# Patient Record
Sex: MALE | URBAN - METROPOLITAN AREA
[De-identification: names, ages, dates, MRNs, and addresses within clinical notes are randomized per-mention and may not be internally consistent; named-entity substitution may affect disease eponyms.]

---

## 2020-02-18 ENCOUNTER — OFFICE VISIT (OUTPATIENT)
Dept: URBAN - METROPOLITAN AREA CLINIC 72 | Facility: CLINIC | Age: 85
End: 2020-02-18
Payer: COMMERCIAL

## 2020-02-18 DIAGNOSIS — H35.3131 NONEXUDATIVE AGE-RELATED MACULAR DEGENERATION, BILATERAL, EARLY DRY STAGE: Primary | ICD-10-CM

## 2020-02-18 DIAGNOSIS — H52.4 PRESBYOPIA: ICD-10-CM

## 2020-02-18 DIAGNOSIS — H43.813 VITREOUS DEGENERATION, BILATERAL: ICD-10-CM

## 2020-02-18 PROCEDURE — 99204 OFFICE O/P NEW MOD 45 MIN: CPT | Performed by: OPTOMETRIST

## 2020-02-18 ASSESSMENT — INTRAOCULAR PRESSURE
OS: 8
OD: 10

## 2020-02-18 ASSESSMENT — VISUAL ACUITY
OS: 20/40
OD: 20/25

## 2020-02-18 NOTE — IMPRESSION/PLAN
Impression: Nonexudative age-related macular degeneration, bilateral, early dry stage: H35.3131. Plan: Macular degeneration, dry type with stable vision. patient given updated glasses rx. Will continue to monitor vision and the patient has been instructed to call with any vision changes.

## 2020-06-17 ENCOUNTER — OFFICE VISIT (OUTPATIENT)
Dept: URBAN - METROPOLITAN AREA CLINIC 72 | Facility: CLINIC | Age: 85
End: 2020-06-17
Payer: COMMERCIAL

## 2020-06-17 DIAGNOSIS — Z96.1 PRESENCE OF INTRAOCULAR LENS: ICD-10-CM

## 2020-06-17 PROCEDURE — 92014 COMPRE OPH EXAM EST PT 1/>: CPT | Performed by: OPTOMETRIST

## 2020-06-17 PROCEDURE — 92134 CPTRZ OPH DX IMG PST SGM RTA: CPT | Performed by: OPTOMETRIST

## 2020-06-17 ASSESSMENT — INTRAOCULAR PRESSURE
OD: 10
OS: 10

## 2020-06-17 NOTE — IMPRESSION/PLAN
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 Plan: OU:  Will continue to observe condition

## 2020-06-17 NOTE — IMPRESSION/PLAN
Impression: Nonexudative age-related macular degeneration, bilateral, early dry stage: H35.3131. Plan: Macular degeneration, dry type with stable vision. discussed the importance of wearing sunglasses whenever outside. Starting on [employer] eye vitamins. recommend the patient start monitoring his vision at home a couple time a week. patient advised if vision changes or worsens to RTC. will continue to monitor in 4 months with OCT imaging.

## 2020-08-21 ENCOUNTER — OFFICE VISIT (OUTPATIENT)
Dept: URBAN - METROPOLITAN AREA CLINIC 72 | Facility: CLINIC | Age: 85
End: 2020-08-21
Payer: COMMERCIAL

## 2020-08-21 PROCEDURE — 92014 COMPRE OPH EXAM EST PT 1/>: CPT | Performed by: OPTOMETRIST

## 2020-08-21 ASSESSMENT — VISUAL ACUITY
OS: 20/40
OD: 20/30

## 2020-08-21 ASSESSMENT — INTRAOCULAR PRESSURE
OS: 10
OD: 9

## 2020-08-21 NOTE — IMPRESSION/PLAN
Impression: Presbyopia: H52.4. Plan: New spec Rx given - Discussed changes and possible adaptation period.

## 2020-08-21 NOTE — IMPRESSION/PLAN
Impression: Nonexudative age-related macular degeneration, bilateral, early dry stage: H35.3131. Macular degeneration, dry type with stable vision. Plan: Observe. Pt to continue w/ eye vitamins. patient advised if vision changes or worsens to RTC. will continue to monitor in 4-6 months with OCT imaging.

## 2022-01-25 ENCOUNTER — OFFICE VISIT (OUTPATIENT)
Dept: URBAN - METROPOLITAN AREA CLINIC 72 | Facility: CLINIC | Age: 87
End: 2022-01-25
Payer: COMMERCIAL

## 2022-01-25 DIAGNOSIS — H35.3194 NONEXUDATIVE AGE-RELATED MACULAR DEGENERATION, ADVANCED ATROPHIC W/ SUBFOVEAL INVOLVEMENT: Primary | ICD-10-CM

## 2022-01-25 DIAGNOSIS — E11.9 TYPE 2 DIABETES MELLITUS WITHOUT COMPLICATIONS: ICD-10-CM

## 2022-01-25 PROCEDURE — 92134 CPTRZ OPH DX IMG PST SGM RTA: CPT | Performed by: OPTOMETRIST

## 2022-01-25 PROCEDURE — 99214 OFFICE O/P EST MOD 30 MIN: CPT | Performed by: OPTOMETRIST

## 2022-01-25 ASSESSMENT — INTRAOCULAR PRESSURE
OS: 10
OD: 12

## 2022-01-25 NOTE — IMPRESSION/PLAN
Impression: Type 2 diabetes mellitus without complications: K85.0. Plan: No retinopathy found on today's examination. Discussed importance of blood sugar, blood pressure and cholesterol control. Letter with today's examination results sent to managing provider.  RTC 1 year for [facility]

## 2022-01-25 NOTE — IMPRESSION/PLAN
Impression: Vitreous degeneration, bilateral: H43.813 Bilateral. Plan: Discussed Dx of posterior vitreous detachment. Discussed signs and symptoms of retinal tear/detachment. Pt to RTC PRN with any change to visual status.

## 2022-01-25 NOTE — IMPRESSION/PLAN
Impression: Nonexudative age-related macular degeneration, advanced atrophic w/ subfoveal involvement: H35.3194. OS > OD Plan: Discussed. Monitor.

## 2022-05-23 ENCOUNTER — OFFICE VISIT (OUTPATIENT)
Dept: URBAN - METROPOLITAN AREA CLINIC 72 | Facility: CLINIC | Age: 87
End: 2022-05-23
Payer: COMMERCIAL

## 2022-05-23 DIAGNOSIS — H35.3131 NONEXUDATIVE AGE-RELATED MACULAR DEGENERATION, BILATERAL, EARLY DRY STAGE: ICD-10-CM

## 2022-05-23 DIAGNOSIS — H43.813 VITREOUS DEGENERATION, BILATERAL: ICD-10-CM

## 2022-05-23 DIAGNOSIS — H52.4 PRESBYOPIA: ICD-10-CM

## 2022-05-23 DIAGNOSIS — E11.3293 TYPE 2 DIAB W MILD NONPRLF DIABETIC RTNOP W/O MACULAR EDEMA, BILATERAL: Primary | ICD-10-CM

## 2022-05-23 PROCEDURE — 99213 OFFICE O/P EST LOW 20 MIN: CPT | Performed by: OPTOMETRIST

## 2022-05-23 ASSESSMENT — VISUAL ACUITY
OD: 20/25
OS: 20/40

## 2022-05-23 ASSESSMENT — INTRAOCULAR PRESSURE
OD: 10
OS: 10

## 2022-05-23 NOTE — IMPRESSION/PLAN
Impression: Nonexudative age-related macular degeneration, bilateral, early dry stage: H35.3131. Plan: Discussed DX in detail with pt
explained there is no treatment for Dry AMD 
Do not recommend vitamins at this time due to early stages. Recommend yearly DFE and OCT Pt voices understanding

## 2022-05-23 NOTE — IMPRESSION/PLAN
Impression: Type 2 diab w mild nonprlf diabetic rtnop w/o macular edema, bilateral: T93.0746. On the verge of moderate stage Optos ordered and done today 
scattered MA's and Blot hemes OU Plan: Diabetes type II: mild background diabetic retinopathy, no signs of neovascularization noted. No treatment necessary at this time. Patient was instructed to monitor vision for sudden changes and to call if visual changes noted. Discussed ocular and systemic benefits of blood sugar control.

## 2023-04-25 ENCOUNTER — OFFICE VISIT (OUTPATIENT)
Dept: URBAN - METROPOLITAN AREA CLINIC 72 | Facility: CLINIC | Age: 88
End: 2023-04-25
Payer: COMMERCIAL

## 2023-04-25 DIAGNOSIS — H52.4 PRESBYOPIA: ICD-10-CM

## 2023-04-25 DIAGNOSIS — H35.54 DYSTROPHIES PRIMARILY W THE RETINAL PIGMENT EPITHELIUM: ICD-10-CM

## 2023-04-25 DIAGNOSIS — H43.813 VITREOUS DEGENERATION, BILATERAL: ICD-10-CM

## 2023-04-25 DIAGNOSIS — E11.3393 TYPE 2 DIAB W MODERATE NONPRLF DIAB RTNOP W/O MACULAR EDEMA, BILATERAL: Primary | ICD-10-CM

## 2023-04-25 PROCEDURE — 99214 OFFICE O/P EST MOD 30 MIN: CPT | Performed by: OPTOMETRIST

## 2023-04-25 ASSESSMENT — VISUAL ACUITY
OS: 20/100
OD: 20/30

## 2023-04-25 ASSESSMENT — INTRAOCULAR PRESSURE
OS: 16
OD: 15

## 2023-04-25 NOTE — IMPRESSION/PLAN
Impression: Type 2 diab w moderate nonprlf diab rtnop w/o macular edema, bilateral: H69.0556. Optos ordered and done today 
scattered MA's and blot hemes 
slightly worse from last year Plan: Diabetes type II: moderate background diabetic retinopathy, no signs of neovascularization noted. Discussed ocular and systemic benefits of maintaining blood sugar control.

## 2023-04-25 NOTE — IMPRESSION/PLAN
Impression: Vitreous degeneration, bilateral: H43.813 Bilateral. Plan: Discussed Dx of posterior vitreous detachment, along with signs and symptoms of retinal tear/detachment. Pt to RTC PRN with any change to visual status.

## 2023-04-25 NOTE — IMPRESSION/PLAN
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 
OS>OD  Plan: Discussed DX today with pt Explained that pt has significant atrophy OU. Informed pt there is new injection for RPE atrophy, Syfovre. Recommend pt to get consult with retina to further discuss. Suggested injection to preserve OD and possibly improve VA OS.  
Pt understood and wished to proceed